# Patient Record
Sex: MALE | URBAN - METROPOLITAN AREA
[De-identification: names, ages, dates, MRNs, and addresses within clinical notes are randomized per-mention and may not be internally consistent; named-entity substitution may affect disease eponyms.]

---

## 2020-06-01 ENCOUNTER — OFFICE VISIT (OUTPATIENT)
Dept: URBAN - METROPOLITAN AREA CLINIC 33 | Facility: CLINIC | Age: 60
End: 2020-06-01

## 2020-06-01 VITALS — TEMPERATURE: 95.5 F | SYSTOLIC BLOOD PRESSURE: 140 MMHG | HEIGHT: 73 IN | DIASTOLIC BLOOD PRESSURE: 80 MMHG

## 2020-06-01 RX ORDER — LISINOPRIL 20 MG/1
TAKE ONE TABLET BY MOUTH ONE TIME DAILY TABLET ORAL
Qty: 90 UNSPECIFIED | Refills: 1 | Status: ACTIVE | COMMUNITY

## 2020-06-01 RX ORDER — POLYETHYLENE GLYCOL 3350, SODIUM SULFATE, SODIUM CHLORIDE, POTASSIUM CHLORIDE, ASCORBIC ACID, SODIUM ASCORBATE 140-9-5.2G
140 ML KIT ORAL BID
Qty: 1 KIT | Refills: 0 | OUTPATIENT
Start: 2020-06-01

## 2020-06-01 RX ORDER — POLYETHYLENE GLYCOL 3350, SODIUM SULFATE, SODIUM CHLORIDE, POTASSIUM CHLORIDE, ASCORBIC ACID, SODIUM ASCORBATE 140-9-5.2G
AS DIRECTED KIT ORAL ONCE
Qty: 1 KIT | Refills: 0 | Status: ON HOLD | COMMUNITY
Start: 2019-11-01

## 2020-06-01 RX ORDER — METFORMIN HYDROCHLORIDE 1000 MG/1
TAKE TWO TABLET BY MOUTH TWICE A DAY TABLET ORAL
Status: ACTIVE | COMMUNITY

## 2020-06-01 RX ORDER — ESCITALOPRAM 10 MG/1
TAKE ONE TABLET BY MOUTH ONE TIME DAILY TABLET, FILM COATED ORAL
Qty: 30 UNSPECIFIED | Refills: 1 | Status: ACTIVE | COMMUNITY

## 2020-06-01 NOTE — HPI-MIGRATED HPI
;   ;     Colon Polyp : Colonoscopy 12/2019 showed a TVA in rectum but also another polyp in cecum that could not be removed as patient was coughing severely during procedure so not safe. He is on a recall for repeat colonoscopy now.;   Elevated Liver Enzymes : Patient presents today for follow up of elevated liver enzymes, he has not completed his labs that was ordered at previous visit.   His last set of liver enzymes Nov 2019 was NL.  Patient has had inguinal hernia surgery January 31, 2020 with Dr. Irwin. He is doing well.    Patient currently denies jaundice, chills, RUQ pains, dizziness, easy bruising, fatigue. No J/AS/DU.   Last visit (1/10/2020) Patient presents today for a follow up after his screening colonoscopy and for the Hx of  elevated liver enzymes. Patient denies any complications after his procedure.   Patient admits 1-2 BM's  per day with normal stools.  Patient denies any rectal bleeding, melena, mucus, pruritus ani or rectal pain at this time. Patient admits to taking Metamucil to regulate BM's  Patient currently denies jaundice, chills, RUQ pains, dizziness, easy bruising, fatigue. No J/AS/DU.   Colonoscopy and path report as documented below. patient had a 10-12 TVA removed from rectum. he also had a 10-12 mm sessile polyp in cecum that was not removed as patient had been recovering from a Cold and was coughing significantly at the time of procedure so it was not safe to remove it.     CT abdomen and pelvis  and labs results are as documented below.  CT confirmed the right inguinal hernia and he has been symptomatic from it. Patient is scheduled for right inguinal repair January 31 st. Of note, no liver steatosis mentioned on CT. Regarding liver enzymes, w/u so far has been negative for etiology. 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  Repeat liver enzymes though 11/19/19 were NL.    Last visit (11/1/2019) 59 year old male presents today for consultation of elevated liver enzymes. Patient presents today for consultation about recent elevated liver enzymes. He has T2 DM, elevated triglycerides and he is overweight. July 22, 2019:  ALT 90, AST 65. NL TB and ALP, NL CBC. Patient admits he has been told to have elevated liver enzymes for the past 20 years; mentions his prior MD's thought elevation was due to meds for hyperlipidemia. Patient currently denies jaundice, chills, RUQ pains, dizziness, easy bruising, fatigue. No J/AS/DU.  Alcohol:  1 beer a month. He admits to taking Ibuprofen 800 mg due to cold recently and intermittent use of Aleve or Advil.    Denies drugs, travel outside the [place], protein supplements, piercing,  service, blood transfusion, family history of liver disease or cancer.  Another issue he brings today pain in RLQ and also right inguinal area gets swollen/bulging on and off for last 2 months. Recent US ordered by PMD was negative for inguinal hernia.  BM's are regular 2-3 times a day with Metamucil.  He has hemorrhoids and sometimes sees blood with BM's (drops of blood). Patient has not had a colonoscopy yet.;

## 2020-06-05 ENCOUNTER — OFFICE VISIT (OUTPATIENT)
Dept: URBAN - METROPOLITAN AREA CLINIC 31 | Facility: CLINIC | Age: 60
End: 2020-06-05

## 2020-08-21 ENCOUNTER — OFFICE VISIT (OUTPATIENT)
Dept: URBAN - METROPOLITAN AREA MEDICAL CENTER 10 | Facility: MEDICAL CENTER | Age: 60
End: 2020-08-21

## 2020-09-01 ENCOUNTER — TELEPHONE ENCOUNTER (OUTPATIENT)
Dept: URBAN - METROPOLITAN AREA CLINIC 35 | Facility: CLINIC | Age: 60
End: 2020-09-01

## 2020-09-01 ENCOUNTER — OFFICE VISIT (OUTPATIENT)
Dept: URBAN - METROPOLITAN AREA CLINIC 33 | Facility: CLINIC | Age: 60
End: 2020-09-01

## 2020-09-01 NOTE — HPI-MIGRATED HPI
;   ;     Colon Polyp : Patient presents today for a follow up of his colonoscopy. Patient admits/denies any complications after his procedure.   Patient admits -- BMs per day, with-- stools.   Patient admits/denies any rectal bleeding, melena, mucus, pruritus ani, or rectal pain at this time.   Last visit  Colonoscopy 12/2019 showed a TVA in rectum but also another polyp in cecum that could not be removed as patient was coughing severely during procedure so not safe. He is on a recall for repeat colonoscopy now.;   Elevated Liver Enzymes : Patient presents today for follow up of elevated liver enzymes, he has not completed his labs that was ordered at previous visit.   His last set of liver enzymes Nov 2019 was NL.  Patient has had inguinal hernia surgery January 31, 2020 with Dr. Irwin. He is doing well.    Patient currently denies jaundice, chills, RUQ pains, dizziness, easy bruising, fatigue. No J/AS/DU.   Last visit (1/10/2020) Patient presents today for a follow up after his screening colonoscopy and for the Hx of  elevated liver enzymes. Patient denies any complications after his procedure.   Patient admits 1-2 BM's  per day with normal stools.  Patient denies any rectal bleeding, melena, mucus, pruritus ani or rectal pain at this time. Patient admits to taking Metamucil to regulate BM's  Patient currently denies jaundice, chills, RUQ pains, dizziness, easy bruising, fatigue. No J/AS/DU.   Colonoscopy and path report as documented below. patient had a 10-12 TVA removed from rectum. he also had a 10-12 mm sessile polyp in cecum that was not removed as patient had been recovering from a Cold and was coughing significantly at the time of procedure so it was not safe to remove it.     CT abdomen and pelvis  and labs results are as documented below.  CT confirmed the right inguinal hernia and he has been symptomatic from it. Patient is scheduled for right inguinal repair January 31 st. Of note, no liver steatosis mentioned on CT. Regarding liver enzymes, w/u so far has been negative for etiology. 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  Repeat liver enzymes though 11/19/19 were NL.    Last visit (11/1/2019) 59 year old male presents today for consultation of elevated liver enzymes. Patient presents today for consultation about recent elevated liver enzymes. He has T2 DM, elevated triglycerides and he is overweight. July 22, 2019:  ALT 90, AST 65. NL TB and ALP, NL CBC. Patient admits he has been told to have elevated liver enzymes for the past 20 years; mentions his prior MD's thought elevation was due to meds for hyperlipidemia. Patient currently denies jaundice, chills, RUQ pains, dizziness, easy bruising, fatigue. No J/AS/DU.  Alcohol:  1 beer a month. He admits to taking Ibuprofen 800 mg due to cold recently and intermittent use of Aleve or Advil.    Denies drugs, travel outside the [place], protein supplements, piercing,  service, blood transfusion, family history of liver disease or cancer.  Another issue he brings today pain in RLQ and also right inguinal area gets swollen/bulging on and off for last 2 months. Recent US ordered by PMD was negative for inguinal hernia.  BM's are regular 2-3 times a day with Metamucil.  He has hemorrhoids and sometimes sees blood with BM's (drops of blood). Patient has not had a colonoscopy yet.;

## 2020-09-25 ENCOUNTER — TELEPHONE ENCOUNTER (OUTPATIENT)
Dept: URBAN - METROPOLITAN AREA CLINIC 35 | Facility: CLINIC | Age: 60
End: 2020-09-25

## 2020-09-25 RX ORDER — POLYETHYLENE GLYCOL 3350, SODIUM SULFATE, SODIUM CHLORIDE, POTASSIUM CHLORIDE, ASCORBIC ACID, SODIUM ASCORBATE 140-9-5.2G
140 ML KIT ORAL BID
Qty: 1 KIT | Refills: 0 | OUTPATIENT
Start: 2020-06-01

## 2020-10-06 ENCOUNTER — OFFICE VISIT (OUTPATIENT)
Dept: URBAN - METROPOLITAN AREA MEDICAL CENTER 10 | Facility: MEDICAL CENTER | Age: 60
End: 2020-10-06

## 2020-10-06 RX ORDER — METFORMIN HYDROCHLORIDE 1000 MG/1
TAKE TWO TABLET BY MOUTH TWICE A DAY TABLET ORAL
Status: ACTIVE | COMMUNITY

## 2020-10-06 RX ORDER — ESCITALOPRAM 10 MG/1
TAKE ONE TABLET BY MOUTH ONE TIME DAILY TABLET, FILM COATED ORAL
Qty: 30 UNSPECIFIED | Refills: 1 | Status: ACTIVE | COMMUNITY

## 2020-10-06 RX ORDER — LISINOPRIL 20 MG/1
TAKE ONE TABLET BY MOUTH ONE TIME DAILY TABLET ORAL
Qty: 90 UNSPECIFIED | Refills: 1 | Status: ACTIVE | COMMUNITY

## 2020-10-06 RX ORDER — POLYETHYLENE GLYCOL 3350, SODIUM SULFATE, SODIUM CHLORIDE, POTASSIUM CHLORIDE, ASCORBIC ACID, SODIUM ASCORBATE 140-9-5.2G
AS DIRECTED KIT ORAL ONCE
Qty: 1 KIT | Refills: 0 | Status: ON HOLD | COMMUNITY
Start: 2019-11-01

## 2020-10-06 RX ORDER — POLYETHYLENE GLYCOL 3350, SODIUM SULFATE, SODIUM CHLORIDE, POTASSIUM CHLORIDE, ASCORBIC ACID, SODIUM ASCORBATE 140-9-5.2G
140 ML KIT ORAL BID
Qty: 1 KIT | Refills: 0 | Status: ACTIVE | COMMUNITY
Start: 2020-06-01

## 2020-10-20 ENCOUNTER — OFFICE VISIT (OUTPATIENT)
Dept: URBAN - METROPOLITAN AREA CLINIC 33 | Facility: CLINIC | Age: 60
End: 2020-10-20

## 2020-10-20 ENCOUNTER — TELEPHONE ENCOUNTER (OUTPATIENT)
Dept: URBAN - METROPOLITAN AREA CLINIC 35 | Facility: CLINIC | Age: 60
End: 2020-10-20

## 2020-10-20 VITALS
HEART RATE: 72 BPM | TEMPERATURE: 96.5 F | SYSTOLIC BLOOD PRESSURE: 130 MMHG | WEIGHT: 229 LBS | BODY MASS INDEX: 30.35 KG/M2 | HEIGHT: 73 IN | DIASTOLIC BLOOD PRESSURE: 80 MMHG | OXYGEN SATURATION: 98 %

## 2020-10-20 PROBLEM — 429047008: Status: ACTIVE | Noted: 2020-06-01

## 2020-10-20 RX ORDER — METFORMIN HYDROCHLORIDE 1000 MG/1
TAKE TWO TABLET BY MOUTH TWICE A DAY TABLET ORAL
Status: ACTIVE | COMMUNITY

## 2020-10-20 RX ORDER — ESCITALOPRAM 10 MG/1
TAKE ONE TABLET BY MOUTH ONE TIME DAILY TABLET, FILM COATED ORAL
Qty: 30 UNSPECIFIED | Refills: 1 | Status: ACTIVE | COMMUNITY

## 2020-10-20 RX ORDER — POLYETHYLENE GLYCOL 3350, SODIUM SULFATE, SODIUM CHLORIDE, POTASSIUM CHLORIDE, ASCORBIC ACID, SODIUM ASCORBATE 140-9-5.2G
140 ML KIT ORAL BID
Qty: 1 KIT | Refills: 0 | Status: DISCONTINUED | COMMUNITY
Start: 2020-06-01

## 2020-10-20 RX ORDER — POLYETHYLENE GLYCOL 3350, SODIUM SULFATE, SODIUM CHLORIDE, POTASSIUM CHLORIDE, ASCORBIC ACID, SODIUM ASCORBATE 140-9-5.2G
AS DIRECTED KIT ORAL ONCE
Qty: 1 KIT | Refills: 0 | Status: ON HOLD | COMMUNITY
Start: 2019-11-01

## 2020-10-20 RX ORDER — LISINOPRIL 20 MG/1
TAKE ONE TABLET BY MOUTH ONE TIME DAILY TABLET ORAL
Qty: 90 UNSPECIFIED | Refills: 1 | Status: ACTIVE | COMMUNITY

## 2020-10-20 NOTE — HPI-MIGRATED HPI
;   ;     Colon Polyp : Patient presents today for a follow up of his colonoscopy. Patient denies any complications after his procedure. patient has a hx of colon polyps. A TVA removed from rectum 12/2019 and at the time of colonoscopy, polyp seen at cecum that could not be removed then due to patient coughing severely during procedure  Patient admits 2-3 BMs per day, with soft and formed stools. Admits that lately his stools have been a little hard. denies taking any Metamucil for relief. Patient denies any rectal bleeding, melena, mucus, pruritus ani, or rectal pain at this time.  Colonoscopy 10/6/20: careful inspection of cecum and proximal ascending colon (30 minutes spent looking) and no polyps were found in cecum except for a small one that was removed and path was benign. there was also a small SSA removed from proximal ascending colon. Diverticulosis of DC and SC., IH. TI was NL.  Last visit (06/01.2020) Colonoscopy 12/2019 showed a TVA in rectum but also another polyp in cecum that could not be removed as patient was coughing severely during procedure so not safe. He is on a recall for repeat colonoscopy now.;   Elevated Liver Enzymes : Patient presents today for follow up of elevated liver enzymes, he has not completed his labs that were ordered at previous visit.   Liver enzymes Nov 2019 was NL. Admits to having labs drawn at his PCP 8/13/20:  NL liver enzymes except for ALT of 51.  B/C 48/2.10 (new)  CBC NL  Trig 532  Chol 186  Since labs above, Lipitor was started. Of note: regarding elevated renal function, patient states he has f/u appointment with his PCP in November and repeat blood work will be obtained. Given hx of DM and elevated Trig, the most likely culprit of elevated enzymes is fatty liver although liver looked NL on a prior CT. Iron studies NL, autoimmune panel NL, Hep B and C negative Liver fibrosis markers/Fibrosure: F2, A1-A2   Last visit (06/01/2020) Patient has had inguinal hernia surgery January 31, 2020 with Dr. Irwin. He is doing well.    Patient currently denies jaundice, chills, RUQ pains, dizziness, easy bruising, fatigue. No J/AS/DU.   Last visit (1/10/2020) Patient presents today for a follow up after his screening colonoscopy and for the Hx of  elevated liver enzymes. Patient denies any complications after his procedure.   Patient admits 1-2 BM's  per day with normal stools.  Patient denies any rectal bleeding, melena, mucus, pruritus ani or rectal pain at this time. Patient admits to taking Metamucil to regulate BM's  Patient currently denies jaundice, chills, RUQ pains, dizziness, easy bruising, fatigue. No J/AS/DU.   Colonoscopy and path report as documented below. patient had a 10-12 TVA removed from rectum. he also had a 10-12 mm sessile polyp in cecum that was not removed as patient had been recovering from a Cold and was coughing significantly at the time of procedure so it was not safe to remove it.     CT abdomen and pelvis  and labs results are as documented below.  CT confirmed the right inguinal hernia and he has been symptomatic from it. Patient is scheduled for right inguinal repair January 31 st. Of note, no liver steatosis mentioned on CT. Regarding liver enzymes, w/u so far has been negative for etiology. 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  Repeat liver enzymes though 11/19/19 were NL.    Last visit (11/1/2019) 59 year old male presents today for consultation of elevated liver enzymes. Patient presents today for consultation about recent elevated liver enzymes. He has T2 DM, elevated triglycerides and he is overweight. July 22, 2019:  ALT 90, AST 65. NL TB and ALP, NL CBC. Patient admits he has been told to have elevated liver enzymes for the past 20 years; mentions his prior MD's thought elevation was due to meds for hyperlipidemia. Patient currently denies jaundice, chills, RUQ pains, dizziness, easy bruising, fatigue. No J/AS/DU.  Alcohol:  1 beer a month. He admits to taking Ibuprofen 800 mg due to cold recently and intermittent use of Aleve or Advil.    Denies drugs, travel outside the [place], protein supplements, piercing,  service, blood transfusion, family history of liver disease or cancer.  Another issue he brings today pain in RLQ and also right inguinal area gets swollen/bulging on and off for last 2 months. Recent US ordered by PMD was negative for inguinal hernia.  BM's are regular 2-3 times a day with Metamucil.  He has hemorrhoids and sometimes sees blood with BM's (drops of blood). Patient has not had a colonoscopy yet.;

## 2020-10-23 ENCOUNTER — OFFICE VISIT (OUTPATIENT)
Dept: URBAN - METROPOLITAN AREA CLINIC 31 | Facility: CLINIC | Age: 60
End: 2020-10-23

## 2021-01-08 ENCOUNTER — OFFICE VISIT (OUTPATIENT)
Dept: URBAN - METROPOLITAN AREA CLINIC 31 | Facility: CLINIC | Age: 61
End: 2021-01-08

## 2021-01-26 ENCOUNTER — OFFICE VISIT (OUTPATIENT)
Dept: URBAN - METROPOLITAN AREA CLINIC 35 | Facility: CLINIC | Age: 61
End: 2021-01-26

## 2021-01-26 RX ORDER — METFORMIN HYDROCHLORIDE 1000 MG/1
TAKE TWO TABLET BY MOUTH TWICE A DAY TABLET ORAL
Status: ACTIVE | COMMUNITY

## 2021-01-26 RX ORDER — POLYETHYLENE GLYCOL 3350, SODIUM SULFATE, SODIUM CHLORIDE, POTASSIUM CHLORIDE, ASCORBIC ACID, SODIUM ASCORBATE 140-9-5.2G
AS DIRECTED KIT ORAL ONCE
Qty: 1 KIT | Refills: 0 | Status: ON HOLD | COMMUNITY
Start: 2019-11-01

## 2021-01-26 RX ORDER — ESCITALOPRAM 10 MG/1
TAKE ONE TABLET BY MOUTH ONE TIME DAILY TABLET, FILM COATED ORAL
Qty: 30 UNSPECIFIED | Refills: 1 | Status: ACTIVE | COMMUNITY

## 2021-01-26 RX ORDER — LISINOPRIL 20 MG/1
TAKE ONE TABLET BY MOUTH ONE TIME DAILY TABLET ORAL
Qty: 90 UNSPECIFIED | Refills: 1 | Status: ACTIVE | COMMUNITY

## 2021-01-26 NOTE — HPI-MIGRATED HPI
;     Elevated Liver Enzymes : Patient presents today for follow up of elevated liver enzymes. Patient has not completed the Fibroscan that was ordered at last visit?  Patient admits/denies recent lab work  --BMs, stools are nl  Last visit 10/20/2020 Patient presents today for follow up of elevated liver enzymes, he has not completed his labs that were ordered at previous visit.   Liver enzymes Nov 2019 was NL. Admits to having labs drawn at his PCP 8/13/20:  NL liver enzymes except for ALT of 51.  B/C 48/2.10 (new)  CBC NL  Trig 532  Chol 186  Since labs above, Lipitor was started. Of note: regarding elevated renal function, patient states he has f/u appointment with his PCP in November and repeat blood work will be obtained. Given hx of DM and elevated Trig, the most likely culprit of elevated enzymes is fatty liver although liver looked NL on a prior CT. Iron studies NL, autoimmune panel NL, Hep B and C negative Liver fibrosis markers/Fibrosure: F2, A1-A2   Last visit (06/01/2020) Patient has had inguinal hernia surgery January 31, 2020 with Dr. Irwin. He is doing well.    Patient currently denies jaundice, chills, RUQ pains, dizziness, easy bruising, fatigue. No J/AS/DU.   Last visit (1/10/2020) Patient presents today for a follow up after his screening colonoscopy and for the Hx of  elevated liver enzymes. Patient denies any complications after his procedure.   Patient admits 1-2 BM's  per day with normal stools.  Patient denies any rectal bleeding, melena, mucus, pruritus ani or rectal pain at this time. Patient admits to taking Metamucil to regulate BM's  Patient currently denies jaundice, chills, RUQ pains, dizziness, easy bruising, fatigue. No J/AS/DU.   Colonoscopy and path report as documented below. patient had a 10-12 TVA removed from rectum. he also had a 10-12 mm sessile polyp in cecum that was not removed as patient had been recovering from a Cold and was coughing significantly at the time of procedure so it was not safe to remove it.     CT abdomen and pelvis  and labs results are as documented below.  CT confirmed the right inguinal hernia and he has been symptomatic from it. Patient is scheduled for right inguinal repair January 31 st. Of note, no liver steatosis mentioned on CT. Regarding liver enzymes, w/u so far has been negative for etiology. 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  Repeat liver enzymes though 11/19/19 were NL.    Last visit (11/1/2019) 59 year old male presents today for consultation of elevated liver enzymes. Patient presents today for consultation about recent elevated liver enzymes. He has T2 DM, elevated triglycerides and he is overweight. July 22, 2019:  ALT 90, AST 65. NL TB and ALP, NL CBC. Patient admits he has been told to have elevated liver enzymes for the past 20 years; mentions his prior MD's thought elevation was due to meds for hyperlipidemia. Patient currently denies jaundice, chills, RUQ pains, dizziness, easy bruising, fatigue. No J/AS/DU.  Alcohol:  1 beer a month. He admits to taking Ibuprofen 800 mg due to cold recently and intermittent use of Aleve or Advil.    Denies drugs, travel outside the [place], protein supplements, piercing,  service, blood transfusion, family history of liver disease or cancer.  Another issue he brings today pain in RLQ and also right inguinal area gets swollen/bulging on and off for last 2 months. Recent US ordered by PMD was negative for inguinal hernia.  BM's are regular 2-3 times a day with Metamucil.  He has hemorrhoids and sometimes sees blood with BM's (drops of blood). Patient has not had a colonoscopy yet.;

## 2024-04-18 ENCOUNTER — COLON (OUTPATIENT)
Dept: URBAN - METROPOLITAN AREA MEDICAL CENTER 25 | Facility: MEDICAL CENTER | Age: 64
End: 2024-04-18
Payer: COMMERCIAL

## 2024-04-18 DIAGNOSIS — C18.0 ADENOCARCINOMA OF CECUM: ICD-10-CM

## 2024-04-18 PROCEDURE — 45380 COLONOSCOPY AND BIOPSY: CPT | Performed by: INTERNAL MEDICINE

## 2024-04-18 PROCEDURE — 45381 COLONOSCOPY SUBMUCOUS NJX: CPT | Performed by: INTERNAL MEDICINE

## 2024-04-18 RX ORDER — LISINOPRIL 20 MG/1
TAKE ONE TABLET BY MOUTH ONE TIME DAILY TABLET ORAL
Qty: 90 UNSPECIFIED | Refills: 1 | Status: ACTIVE | COMMUNITY

## 2024-04-18 RX ORDER — POLYETHYLENE GLYCOL 3350, SODIUM SULFATE, SODIUM CHLORIDE, POTASSIUM CHLORIDE, ASCORBIC ACID, SODIUM ASCORBATE 140-9-5.2G
AS DIRECTED KIT ORAL ONCE
Qty: 1 KIT | Refills: 0 | Status: ON HOLD | COMMUNITY
Start: 2019-11-01

## 2024-04-18 RX ORDER — ESCITALOPRAM 10 MG/1
TAKE ONE TABLET BY MOUTH ONE TIME DAILY TABLET, FILM COATED ORAL
Qty: 30 UNSPECIFIED | Refills: 1 | Status: ACTIVE | COMMUNITY

## 2024-04-18 RX ORDER — METFORMIN HYDROCHLORIDE 1000 MG/1
TAKE TWO TABLET BY MOUTH TWICE A DAY TABLET ORAL
Status: ACTIVE | COMMUNITY